# Patient Record
Sex: MALE | Race: WHITE | NOT HISPANIC OR LATINO
[De-identification: names, ages, dates, MRNs, and addresses within clinical notes are randomized per-mention and may not be internally consistent; named-entity substitution may affect disease eponyms.]

---

## 2023-01-31 ENCOUNTER — TRANSCRIPTION ENCOUNTER (OUTPATIENT)
Age: 38
End: 2023-01-31

## 2023-02-02 PROBLEM — Z00.00 ENCOUNTER FOR PREVENTIVE HEALTH EXAMINATION: Status: ACTIVE | Noted: 2023-02-02

## 2023-02-03 ENCOUNTER — NON-APPOINTMENT (OUTPATIENT)
Age: 38
End: 2023-02-03

## 2023-02-03 ENCOUNTER — APPOINTMENT (OUTPATIENT)
Dept: HEART AND VASCULAR | Facility: CLINIC | Age: 38
End: 2023-02-03
Payer: COMMERCIAL

## 2023-02-03 VITALS
HEART RATE: 79 BPM | SYSTOLIC BLOOD PRESSURE: 155 MMHG | HEIGHT: 68 IN | OXYGEN SATURATION: 98 % | DIASTOLIC BLOOD PRESSURE: 90 MMHG | BODY MASS INDEX: 43.19 KG/M2 | WEIGHT: 285 LBS | TEMPERATURE: 98.7 F

## 2023-02-03 DIAGNOSIS — Z78.9 OTHER SPECIFIED HEALTH STATUS: ICD-10-CM

## 2023-02-03 DIAGNOSIS — Z82.49 FAMILY HISTORY OF ISCHEMIC HEART DISEASE AND OTHER DISEASES OF THE CIRCULATORY SYSTEM: ICD-10-CM

## 2023-02-03 DIAGNOSIS — E66.9 OBESITY, UNSPECIFIED: ICD-10-CM

## 2023-02-03 PROCEDURE — 93000 ELECTROCARDIOGRAM COMPLETE: CPT

## 2023-02-03 PROCEDURE — 99204 OFFICE O/P NEW MOD 45 MIN: CPT | Mod: 25

## 2023-02-03 RX ORDER — FINASTERIDE 1 MG/1
1 TABLET ORAL
Refills: 0 | Status: ACTIVE | COMMUNITY

## 2023-02-03 NOTE — REASON FOR VISIT
[FreeTextEntry1] : 38 y/o M with strong FHx of CAD, obesity, newly diagnosed HTN who presents for cardiology consultation. Was in ER for CP and had nuclear stress test that was normal. His BP was 194mmHg sbp. \par Since then doing well, no issues.\par No chest pain\par On amlodipine and metoprolol \par Denies SOB\par \par \par Testing/Imaging Reviewed\par EKG 2/3/23- NSR, normal\par \par Pharm Nuclear Stress Test 1/2023\par --------------------------------------------------------------------------------------------------------------------------------------------------------Conclusions:\par 1. Myocardial Perfusion: Probably Normal.\par 2. Qualitative Perfusion:\par - small-sized, mild defect(s) in the basal inferior wall that is partially reversible suggestive of diaphragmatic attenuation artifact.\par 3. The left ventricle is normal in function and normal in size.\par 4. The resting left ventricular EF% is 48 %. The stress left ventricular EF% is 53 %.\par 5. The patient underwent stress testing using pharmacological IV regadenoson (bolus injection, 400mcg over 10 to 20 seconds followed by 5ml flush) protocol.\par _ The total procedure time was 4 minutes.\par 6. The heart rate response was normal.\par 7. Blood pressure response: hypertensive throughout test.\par 8. Baseline ECG: normal sinus rhythm at a rate of 81 bpm with no arrythmias.\par 9. No ischemic ST segment changes.\par 10. Chest pain prior to test: no chest pain. Chest pain during test: no chest pain.\par 11. No prior study available for comparison.\par

## 2023-02-03 NOTE — ASSESSMENT
[FreeTextEntry1] : HTN- secondary workup\par -renal US, TSH\par -increase amlodipine to 10mg\par -would stop metoprolol (no CAD)\par \par Chest pain- with HTN and recent admission, recommend TTE\par \par Strong FHx of CAD- check labs, lipids/a1c, TSH\par \par f/u with PCP next week for BP check

## 2023-02-15 ENCOUNTER — TRANSCRIPTION ENCOUNTER (OUTPATIENT)
Age: 38
End: 2023-02-15

## 2023-02-21 LAB
CHOLEST SERPL-MCNC: 221 MG/DL
ESTIMATED AVERAGE GLUCOSE: 91 MG/DL
HBA1C MFR BLD HPLC: 4.8 %
HDLC SERPL-MCNC: 45 MG/DL
LDLC SERPL CALC-MCNC: 146 MG/DL
NONHDLC SERPL-MCNC: 176 MG/DL
TRIGL SERPL-MCNC: 154 MG/DL
TSH SERPL-ACNC: 2.79 UIU/ML

## 2023-03-08 ENCOUNTER — RESULT REVIEW (OUTPATIENT)
Age: 38
End: 2023-03-08

## 2023-03-30 RX ORDER — AMLODIPINE BESYLATE 10 MG/1
10 TABLET ORAL DAILY
Qty: 90 | Refills: 3 | Status: DISCONTINUED | COMMUNITY
Start: 2023-02-03 | End: 2023-03-30

## 2023-06-23 ENCOUNTER — APPOINTMENT (OUTPATIENT)
Dept: HEART AND VASCULAR | Facility: CLINIC | Age: 38
End: 2023-06-23
Payer: COMMERCIAL

## 2023-06-23 ENCOUNTER — NON-APPOINTMENT (OUTPATIENT)
Age: 38
End: 2023-06-23

## 2023-06-23 VITALS
OXYGEN SATURATION: 99 % | SYSTOLIC BLOOD PRESSURE: 134 MMHG | DIASTOLIC BLOOD PRESSURE: 86 MMHG | WEIGHT: 284 LBS | BODY MASS INDEX: 43.18 KG/M2 | HEART RATE: 84 BPM

## 2023-06-23 DIAGNOSIS — R94.39 ABNORMAL RESULT OF OTHER CARDIOVASCULAR FUNCTION STUDY: ICD-10-CM

## 2023-06-23 PROCEDURE — 99214 OFFICE O/P EST MOD 30 MIN: CPT | Mod: 25

## 2023-06-23 PROCEDURE — 99204 OFFICE O/P NEW MOD 45 MIN: CPT | Mod: 25

## 2023-06-23 PROCEDURE — 93000 ELECTROCARDIOGRAM COMPLETE: CPT

## 2023-06-23 RX ORDER — METOPROLOL SUCCINATE 25 MG/1
25 TABLET, EXTENDED RELEASE ORAL
Refills: 0 | Status: DISCONTINUED | COMMUNITY
End: 2023-06-23

## 2023-06-23 RX ORDER — AMLODIPINE BESYLATE 5 MG/1
5 TABLET ORAL
Refills: 0 | Status: DISCONTINUED | COMMUNITY
End: 2023-06-23

## 2023-06-23 NOTE — HISTORY OF PRESENT ILLNESS
[FreeTextEntry1] : 37 M obesity, recently diagnosed HTN, HLD\par \par Patient was in ER Jan 2023 for CP/palps that lasted a few days before going to the hospital, ruled out for ACS,  had nuclear stress test with no ischemia.  Symptoms self resolved after ER visit.  Subsequently had an ECHO which showed normal EF, though notable for ascending aortic aneurysm 4.8cm.  No cross sectional imaging performed.  Patient also had a renal artery US which showed no significant SRIDHAR.\par \par Weight has been unchanged for many years.   Exercises regularly 4-5 times a week.   Try's to eat a well balanced diet,  \par \par Currently on lisinopril 5mg daily.  Home BP logs 120s-130s/70s-80s.  Tolerating lipitor, has been on it ~ 3 months.  \par \par Non smoker\par No etoh use\par FHx of Father CAD/PCI late 60s  \par

## 2023-06-23 NOTE — CARDIOLOGY SUMMARY
[de-identified] : \par Pharm Nuclear Stress Test 1/2023\par --------------------------------------------------------------------------------------------------------------------------------------------------------Conclusions:\par 1. Myocardial Perfusion: Probably Normal.\par 2. Qualitative Perfusion:\par - small-sized, mild defect(s) in the basal inferior wall that is partially reversible suggestive of diaphragmatic attenuation artifact.\par 3. The left ventricle is normal in function and normal in size.\par 4. The resting left ventricular EF% is 48 %. The stress left ventricular EF% is 53 %.\par 5. The patient underwent stress testing using pharmacological IV regadenoson (bolus injection, 400mcg over 10 to 20 seconds followed by 5ml flush) protocol.\par _ The total procedure time was 4 minutes.\par 6. The heart rate response was normal.\par 7. Blood pressure response: hypertensive throughout test.\par 8. Baseline ECG: normal sinus rhythm at a rate of 81 bpm with no arrythmias.\par 9. No ischemic ST segment changes.\par 10. Chest pain prior to test: no chest pain. Chest pain during test: no chest pain.\par 11. No prior study available for comparison.\par

## 2023-06-23 NOTE — ASSESSMENT
[FreeTextEntry1] : 37 M \par \par HTN \par HLD\par Moderate/Large Aortic Aneurysm\par \par EKG today NSR PVC\par \par - BP not optimally controlled, transition low dose lisinopril to losartan 50mg daily (pending stable K and renal function).  Needs aggressive BP control if indeed he has an aortic aneurysm.  Renal artery US findings are not significant, unlikely cause of hypertension.  If unable to control BPs will consider CTA/MRA abdomen for more defnitive assessment as US was technically difficult. \par - given possible moderate to large ascending aortic aneurysm noted on ECHO Feb 202 ill obtain a coronary CTA and full CTA chest given his prior CP, possible ischemia oo nuclear stress and to evaluate entire thoracic aorta\par - continue lipitor 20mg \par - check CBC. CMP, lipids today\par

## 2023-07-18 ENCOUNTER — TRANSCRIPTION ENCOUNTER (OUTPATIENT)
Age: 38
End: 2023-07-18

## 2023-07-26 ENCOUNTER — RESULT REVIEW (OUTPATIENT)
Age: 38
End: 2023-07-26

## 2023-08-04 ENCOUNTER — APPOINTMENT (OUTPATIENT)
Dept: HEART AND VASCULAR | Facility: CLINIC | Age: 38
End: 2023-08-04
Payer: COMMERCIAL

## 2023-08-04 VITALS
WEIGHT: 287 LBS | SYSTOLIC BLOOD PRESSURE: 148 MMHG | OXYGEN SATURATION: 98 % | BODY MASS INDEX: 43.64 KG/M2 | HEART RATE: 60 BPM | DIASTOLIC BLOOD PRESSURE: 98 MMHG

## 2023-08-04 VITALS — WEIGHT: 287 LBS | BODY MASS INDEX: 43.64 KG/M2

## 2023-08-04 DIAGNOSIS — I71.21 ANEURYSM OF THE ASCENDING AORTA, WITHOUT RUPTURE: ICD-10-CM

## 2023-08-04 DIAGNOSIS — R07.89 OTHER CHEST PAIN: ICD-10-CM

## 2023-08-04 LAB
ALBUMIN SERPL ELPH-MCNC: 4.7 G/DL
ALP BLD-CCNC: 89 U/L
ALT SERPL-CCNC: 34 U/L
ANION GAP SERPL CALC-SCNC: 11 MMOL/L
AST SERPL-CCNC: 37 U/L
BILIRUB SERPL-MCNC: 0.3 MG/DL
BUN SERPL-MCNC: 11 MG/DL
CALCIUM SERPL-MCNC: 9.9 MG/DL
CHLORIDE SERPL-SCNC: 105 MMOL/L
CHOLEST SERPL-MCNC: 141 MG/DL
CO2 SERPL-SCNC: 24 MMOL/L
CREAT SERPL-MCNC: 1.1 MG/DL
EGFR: 89 ML/MIN/1.73M2
GLUCOSE SERPL-MCNC: 93 MG/DL
HDLC SERPL-MCNC: 41 MG/DL
LDLC SERPL CALC-MCNC: 74 MG/DL
NONHDLC SERPL-MCNC: 99 MG/DL
POTASSIUM SERPL-SCNC: 4.7 MMOL/L
PROT SERPL-MCNC: 7.1 G/DL
SODIUM SERPL-SCNC: 141 MMOL/L
TRIGL SERPL-MCNC: 126 MG/DL

## 2023-08-04 PROCEDURE — 99213 OFFICE O/P EST LOW 20 MIN: CPT

## 2023-08-04 NOTE — HISTORY OF PRESENT ILLNESS
[FreeTextEntry1] : 37 M obesity, recently diagnosed HTN, HLD  Patient was in ER Jan 2023 for CP/palps that lasted a few days before going to the hospital, ruled out for ACS,  had nuclear stress test with no ischemia.  Symptoms self resolved after ER visit.  Subsequently had an ECHO which showed normal EF, though notable for ascending aortic aneurysm 4.8cm.  No cross sectional imaging performed.  Patient also had a renal artery US which showed no significant SRIDHAR.  Weight has been unchanged for many years.   Exercises regularly 4-5 times a week.   Try's to eat a well balanced diet,    Currently on lisinopril 5mg daily.  Home BP logs 120s-130s/70s-80s.  Tolerating lipitor, has been on it ~ 3 months.    8/4/23: BPs avg high 130s systolic.  No complaints.  Has been unable to lose weight.  No further episodes of chest pain.   Non smoker No etoh use FHx of Father CAD/PCI late 60s

## 2023-08-04 NOTE — ASSESSMENT
[FreeTextEntry1] : 37 M   Atypical Chest Pain - resolved, no recurrence HTN  HLD Obesity  EKG NSR PVC  - reviewed recent testing.   - NO aortic aneurysm noted on CTA.  ECHO from Feb 2022 was erroneous.   - Coronary CTA with minimal CAD, calcium score zero.   - continue lipitor 20mg, lipids now at goal LDL 74 - BP not optimally controlled on losartan 50mg daily.  Start chlorthalidone 25mg daily.  Renal artery US findings are not significant, unlikely cause of hypertension.  If unable to control BPs will consider CTA/MRA abdomen for more definitive assessment as US was technically difficult.   - recommend endocrinology evaluation for weight loss.

## 2023-08-04 NOTE — CARDIOLOGY SUMMARY
[de-identified] : \par  Pharm Nuclear Stress Test 1/2023\par  --------------------------------------------------------------------------------------------------------------------------------------------------------Conclusions:\par  1. Myocardial Perfusion: Probably Normal.\par  2. Qualitative Perfusion:\par  - small-sized, mild defect(s) in the basal inferior wall that is partially reversible suggestive of diaphragmatic attenuation artifact.\par  3. The left ventricle is normal in function and normal in size.\par  4. The resting left ventricular EF% is 48 %. The stress left ventricular EF% is 53 %.\par  5. The patient underwent stress testing using pharmacological IV regadenoson (bolus injection, 400mcg over 10 to 20 seconds followed by 5ml flush) protocol.\par  _ The total procedure time was 4 minutes.\par  6. The heart rate response was normal.\par  7. Blood pressure response: hypertensive throughout test.\par  8. Baseline ECG: normal sinus rhythm at a rate of 81 bpm with no arrythmias.\par  9. No ischemic ST segment changes.\par  10. Chest pain prior to test: no chest pain. Chest pain during test: no chest pain.\par  11. No prior study available for comparison.\par

## 2023-08-05 ENCOUNTER — NON-APPOINTMENT (OUTPATIENT)
Age: 38
End: 2023-08-05

## 2023-09-11 ENCOUNTER — APPOINTMENT (OUTPATIENT)
Dept: CARDIOLOGY | Facility: CLINIC | Age: 38
End: 2023-09-11
Payer: COMMERCIAL

## 2023-09-11 ENCOUNTER — LABORATORY RESULT (OUTPATIENT)
Age: 38
End: 2023-09-11

## 2023-09-11 PROCEDURE — 36415 COLL VENOUS BLD VENIPUNCTURE: CPT

## 2023-09-15 ENCOUNTER — APPOINTMENT (OUTPATIENT)
Dept: HEART AND VASCULAR | Facility: CLINIC | Age: 38
End: 2023-09-15
Payer: COMMERCIAL

## 2023-09-15 ENCOUNTER — NON-APPOINTMENT (OUTPATIENT)
Age: 38
End: 2023-09-15

## 2023-09-15 VITALS
DIASTOLIC BLOOD PRESSURE: 88 MMHG | SYSTOLIC BLOOD PRESSURE: 136 MMHG | WEIGHT: 286 LBS | BODY MASS INDEX: 43.49 KG/M2 | HEART RATE: 83 BPM | OXYGEN SATURATION: 99 %

## 2023-09-15 PROCEDURE — 93000 ELECTROCARDIOGRAM COMPLETE: CPT

## 2023-09-15 PROCEDURE — 99213 OFFICE O/P EST LOW 20 MIN: CPT | Mod: 25

## 2024-03-15 ENCOUNTER — APPOINTMENT (OUTPATIENT)
Dept: HEART AND VASCULAR | Facility: CLINIC | Age: 39
End: 2024-03-15
Payer: COMMERCIAL

## 2024-03-15 ENCOUNTER — NON-APPOINTMENT (OUTPATIENT)
Age: 39
End: 2024-03-15

## 2024-03-15 VITALS
SYSTOLIC BLOOD PRESSURE: 116 MMHG | BODY MASS INDEX: 44.1 KG/M2 | HEART RATE: 96 BPM | OXYGEN SATURATION: 97 % | DIASTOLIC BLOOD PRESSURE: 80 MMHG | WEIGHT: 291 LBS | HEIGHT: 68 IN

## 2024-03-15 DIAGNOSIS — I10 ESSENTIAL (PRIMARY) HYPERTENSION: ICD-10-CM

## 2024-03-15 DIAGNOSIS — E78.49 OTHER HYPERLIPIDEMIA: ICD-10-CM

## 2024-03-15 PROCEDURE — 99401 PREV MED CNSL INDIV APPRX 15: CPT

## 2024-03-15 PROCEDURE — 93000 ELECTROCARDIOGRAM COMPLETE: CPT

## 2024-03-15 PROCEDURE — 99213 OFFICE O/P EST LOW 20 MIN: CPT | Mod: 25

## 2024-03-15 RX ORDER — METOPROLOL TARTRATE 50 MG/1
50 TABLET, FILM COATED ORAL
Qty: 3 | Refills: 0 | Status: DISCONTINUED | COMMUNITY
Start: 2023-07-26 | End: 2024-03-15

## 2024-03-15 NOTE — ASSESSMENT
[FreeTextEntry1] : 38 M   Atypical Chest Pain - resolved, no recurrence.  NO aortic aneurysm noted on CTA.  ECHO from Feb 2022 was erroneous.   Coronary CTA with minimal CAD, calcium score zero.   HTN - controlled.   Renal artery US findings are not significant, unlikely cause of hypertension.  HLD- at goal LDL 74 Obesity  EKG NSR   - BP controlled on chlorthalidone 25mg, lisinopril 5mg  - continue lipitor 20mg - still pending endocrinology evaluation for weight loss. Given names to call.   - counseled on exercise/weight loss strategies. - rtc 1 year

## 2024-03-15 NOTE — HISTORY OF PRESENT ILLNESS
[FreeTextEntry1] : 38M obesity, recently diagnosed HTN, HLD  Patient was in ER Jan 2023 for CP/palps that lasted a few days before going to the hospital, ruled out for ACS,  had nuclear stress test with no ischemia.  Symptoms self resolved after ER visit.  Subsequently had an ECHO which showed normal EF, though notable for ascending aortic aneurysm 4.8cm.  No cross sectional imaging performed.  Patient also had a renal artery US which showed no significant SRIDHAR. Weight has been unchanged for many years.   Exercises regularly 4-5 times a week.   Try's to eat a well balanced diet,   Currently on lisinopril 5mg daily.  Home BP logs 120s-130s/70s-80s.  Tolerating lipitor, has been on it ~ 3 months.    8/4/23:    BPs avg high 130s systolic.  No complaints.  Has been unable to lose weight.  No further episodes of chest pain.  9/15/23:  doing well on chlorthalidone, BPs 120s/70s predominantly on logs  3/15/24:  has been having trouble losing weight.  BPs at home have been good.  No cp/sob.    Non smoker No etoh use FHx of Father CAD/PCI late 60s

## 2024-03-15 NOTE — PHYSICAL EXAM
[Well Developed] : well developed [Well Nourished] : well nourished [No Acute Distress] : no acute distress [Obese] : obese [Normal Conjunctiva] : normal conjunctiva [Normal Venous Pressure] : normal venous pressure [No Carotid Bruit] : no carotid bruit [Normal S1, S2] : normal S1, S2 [No Rub] : no rub [No Murmur] : no murmur [No Gallop] : no gallop [Clear Lung Fields] : clear lung fields [Good Air Entry] : good air entry [No Respiratory Distress] : no respiratory distress  [Soft] : abdomen soft [No Masses/organomegaly] : no masses/organomegaly [Non Tender] : non-tender [Normal Bowel Sounds] : normal bowel sounds [No Edema] : no edema [Normal Gait] : normal gait [No Cyanosis] : no cyanosis [No Clubbing] : no clubbing [No Varicosities] : no varicosities [No Rash] : no rash [No Skin Lesions] : no skin lesions [Moves all extremities] : moves all extremities [Normal Speech] : normal speech [No Focal Deficits] : no focal deficits [Alert and Oriented] : alert and oriented [Normal memory] : normal memory

## 2024-03-15 NOTE — CARDIOLOGY SUMMARY
[de-identified] : \par  Pharm Nuclear Stress Test 1/2023\par  --------------------------------------------------------------------------------------------------------------------------------------------------------Conclusions:\par  1. Myocardial Perfusion: Probably Normal.\par  2. Qualitative Perfusion:\par  - small-sized, mild defect(s) in the basal inferior wall that is partially reversible suggestive of diaphragmatic attenuation artifact.\par  3. The left ventricle is normal in function and normal in size.\par  4. The resting left ventricular EF% is 48 %. The stress left ventricular EF% is 53 %.\par  5. The patient underwent stress testing using pharmacological IV regadenoson (bolus injection, 400mcg over 10 to 20 seconds followed by 5ml flush) protocol.\par  _ The total procedure time was 4 minutes.\par  6. The heart rate response was normal.\par  7. Blood pressure response: hypertensive throughout test.\par  8. Baseline ECG: normal sinus rhythm at a rate of 81 bpm with no arrythmias.\par  9. No ischemic ST segment changes.\par  10. Chest pain prior to test: no chest pain. Chest pain during test: no chest pain.\par  11. No prior study available for comparison.\par

## 2024-03-21 ENCOUNTER — TRANSCRIPTION ENCOUNTER (OUTPATIENT)
Age: 39
End: 2024-03-21

## 2024-03-21 RX ORDER — LISINOPRIL 5 MG/1
5 TABLET ORAL DAILY
Qty: 90 | Refills: 3 | Status: ACTIVE | COMMUNITY
Start: 2023-03-30 | End: 1900-01-01

## 2024-03-21 RX ORDER — ATORVASTATIN CALCIUM 20 MG/1
20 TABLET, FILM COATED ORAL DAILY
Qty: 90 | Refills: 3 | Status: ACTIVE | COMMUNITY
Start: 2023-03-30 | End: 1900-01-01

## 2024-06-18 ENCOUNTER — RX RENEWAL (OUTPATIENT)
Age: 39
End: 2024-06-18

## 2024-06-18 RX ORDER — CHLORTHALIDONE 25 MG/1
25 TABLET ORAL DAILY
Qty: 90 | Refills: 3 | Status: ACTIVE | COMMUNITY
Start: 2023-08-04 | End: 1900-01-01

## 2025-03-25 ENCOUNTER — RX RENEWAL (OUTPATIENT)
Age: 40
End: 2025-03-25

## 2025-03-25 ENCOUNTER — NON-APPOINTMENT (OUTPATIENT)
Age: 40
End: 2025-03-25

## 2025-03-25 ENCOUNTER — APPOINTMENT (OUTPATIENT)
Dept: HEART AND VASCULAR | Facility: CLINIC | Age: 40
End: 2025-03-25
Payer: COMMERCIAL

## 2025-03-25 VITALS
BODY MASS INDEX: 40.75 KG/M2 | WEIGHT: 268 LBS | HEART RATE: 86 BPM | OXYGEN SATURATION: 100 % | SYSTOLIC BLOOD PRESSURE: 120 MMHG | DIASTOLIC BLOOD PRESSURE: 80 MMHG

## 2025-03-25 DIAGNOSIS — I10 ESSENTIAL (PRIMARY) HYPERTENSION: ICD-10-CM

## 2025-03-25 DIAGNOSIS — E78.49 OTHER HYPERLIPIDEMIA: ICD-10-CM

## 2025-03-25 PROCEDURE — 99214 OFFICE O/P EST MOD 30 MIN: CPT

## 2025-03-25 PROCEDURE — G2211 COMPLEX E/M VISIT ADD ON: CPT | Mod: NC

## 2025-03-25 PROCEDURE — 93000 ELECTROCARDIOGRAM COMPLETE: CPT

## 2025-06-26 ENCOUNTER — RX RENEWAL (OUTPATIENT)
Age: 40
End: 2025-06-26

## 2025-06-30 ENCOUNTER — RX RENEWAL (OUTPATIENT)
Age: 40
End: 2025-06-30